# Patient Record
Sex: MALE | Race: WHITE | NOT HISPANIC OR LATINO | Employment: FULL TIME | URBAN - METROPOLITAN AREA
[De-identification: names, ages, dates, MRNs, and addresses within clinical notes are randomized per-mention and may not be internally consistent; named-entity substitution may affect disease eponyms.]

---

## 2020-07-02 ENCOUNTER — OFFICE VISIT (OUTPATIENT)
Dept: PRIMARY CARE CLINIC | Facility: CLINIC | Age: 30
End: 2020-07-02
Payer: OTHER GOVERNMENT

## 2020-07-02 VITALS
SYSTOLIC BLOOD PRESSURE: 142 MMHG | RESPIRATION RATE: 18 BRPM | TEMPERATURE: 99 F | HEART RATE: 102 BPM | DIASTOLIC BLOOD PRESSURE: 76 MMHG | OXYGEN SATURATION: 95 %

## 2020-07-02 DIAGNOSIS — R05.9 COUGH: ICD-10-CM

## 2020-07-02 DIAGNOSIS — Z03.818 ENCOUNTER FOR OBSERVATION FOR SUSPECTED EXPOSURE TO OTHER BIOLOGICAL AGENTS RULED OUT: ICD-10-CM

## 2020-07-02 PROCEDURE — 99203 OFFICE O/P NEW LOW 30 MIN: CPT | Mod: S$GLB,,, | Performed by: PHYSICIAN ASSISTANT

## 2020-07-02 PROCEDURE — 99203 PR OFFICE/OUTPT VISIT, NEW, LEVL III, 30-44 MIN: ICD-10-PCS | Mod: S$GLB,,, | Performed by: PHYSICIAN ASSISTANT

## 2020-07-02 PROCEDURE — U0003 INFECTIOUS AGENT DETECTION BY NUCLEIC ACID (DNA OR RNA); SEVERE ACUTE RESPIRATORY SYNDROME CORONAVIRUS 2 (SARS-COV-2) (CORONAVIRUS DISEASE [COVID-19]), AMPLIFIED PROBE TECHNIQUE, MAKING USE OF HIGH THROUGHPUT TECHNOLOGIES AS DESCRIBED BY CMS-2020-01-R: HCPCS

## 2020-07-02 NOTE — PROGRESS NOTES
Subjective:        Time seen by provider: 10:16 AM on 07/02/2020    Marlon Duval is a 29 y.o. male who presents for an evaluation of possible COVID-19. The patient c/o cough and sore throat. He denies any other symptoms at this time. He reports recent exposure to a coworker who tested positive for COVID-19. PSHx for nasal sinus surgery and tonsillectomy.     Review of Systems   Constitutional: Negative for activity change, appetite change, fatigue and fever.   HENT: Positive for sore throat. Negative for congestion and rhinorrhea.    Respiratory: Positive for cough. Negative for chest tightness, shortness of breath and wheezing.    Cardiovascular: Negative for chest pain and palpitations.   Gastrointestinal: Negative for diarrhea, nausea and vomiting.   Musculoskeletal: Negative for arthralgias and myalgias.   Skin: Negative for rash.   Neurological: Negative for weakness, light-headedness and headaches.       Objective:      Physical Exam  Vitals signs and nursing note reviewed.   Constitutional:       General: He is not in acute distress.     Appearance: He is well-developed. He is not diaphoretic.   HENT:      Head: Normocephalic and atraumatic.      Nose: Nose normal.   Eyes:      Conjunctiva/sclera: Conjunctivae normal.   Neck:      Musculoskeletal: Normal range of motion.   Cardiovascular:      Rate and Rhythm: Normal rate and regular rhythm.      Heart sounds: Normal heart sounds. No murmur.   Pulmonary:      Effort: Pulmonary effort is normal. No respiratory distress.      Breath sounds: Normal breath sounds. No wheezing.   Musculoskeletal: Normal range of motion.   Skin:     General: Skin is warm and dry.   Neurological:      Mental Status: He is alert and oriented to person, place, and time.         Assessment:       Suspected COVID-19    Plan:       1. Suspected COVID-19  COVID-19 Screening Test   2. Discharge home and await results.   3. Return to clinic or ED for new or worsening symptoms.   4.  Follow-up with PCP as needed.     Scribe Attestation:   I, Janene Florence, am scribing for, and in the presence of, Ronit Barron PA-C. I performed the above scribed service and the documentation accurately describes the services I performed. I attest to the accuracy of the note.    I, Ronit Barron PA-C, personally performed the services described in this documentation. All medical record entries made by the scribe were at my direction and in my presence.  I have reviewed the chart and agree that the record reflects my personal performance and is accurate and complete. Ronit Barron PA-C.  7:25 PM 07/02/2020

## 2020-07-03 LAB — SARS-COV-2 RNA RESP QL NAA+PROBE: NOT DETECTED

## 2020-07-03 NOTE — PATIENT INSTRUCTIONS

## 2020-07-05 ENCOUNTER — HOSPITAL ENCOUNTER (EMERGENCY)
Facility: HOSPITAL | Age: 30
Discharge: HOME | End: 2020-07-05
Attending: EMERGENCY MEDICINE
Payer: OTHER GOVERNMENT

## 2020-07-05 VITALS
SYSTOLIC BLOOD PRESSURE: 128 MMHG | OXYGEN SATURATION: 99 % | BODY MASS INDEX: 27.72 KG/M2 | RESPIRATION RATE: 18 BRPM | HEIGHT: 71 IN | TEMPERATURE: 101 F | WEIGHT: 198 LBS | DIASTOLIC BLOOD PRESSURE: 78 MMHG | HEART RATE: 104 BPM

## 2020-07-05 DIAGNOSIS — J06.9 VIRAL URI WITH COUGH: Primary | ICD-10-CM

## 2020-07-05 PROCEDURE — 99283 EMERGENCY DEPT VISIT LOW MDM: CPT | Mod: 25

## 2020-07-05 PROCEDURE — U0003 INFECTIOUS AGENT DETECTION BY NUCLEIC ACID (DNA OR RNA); SEVERE ACUTE RESPIRATORY SYNDROME CORONAVIRUS 2 (SARS-COV-2) (CORONAVIRUS DISEASE [COVID-19]), AMPLIFIED PROBE TECHNIQUE, MAKING USE OF HIGH THROUGHPUT TECHNOLOGIES AS DESCRIBED BY CMS-2020-01-R: HCPCS

## 2020-07-05 PROCEDURE — 25000003 PHARM REV CODE 250: Performed by: EMERGENCY MEDICINE

## 2020-07-05 RX ORDER — AZITHROMYCIN 250 MG/1
500 TABLET, FILM COATED ORAL
Status: DISCONTINUED | OUTPATIENT
Start: 2020-07-05 | End: 2020-07-05 | Stop reason: HOSPADM

## 2020-07-05 RX ORDER — MINERAL OIL
ENEMA (ML) RECTAL
COMMUNITY
Start: 2020-04-29

## 2020-07-05 RX ORDER — IBUPROFEN 600 MG/1
600 TABLET ORAL
Status: COMPLETED | OUTPATIENT
Start: 2020-07-05 | End: 2020-07-05

## 2020-07-05 RX ORDER — AZELASTINE 1 MG/ML
SPRAY, METERED NASAL
COMMUNITY
Start: 2020-04-29

## 2020-07-05 RX ORDER — AZITHROMYCIN 250 MG/1
250 TABLET, FILM COATED ORAL DAILY
Qty: 4 TABLET | Refills: 0 | Status: SHIPPED | OUTPATIENT
Start: 2020-07-05 | End: 2020-07-09

## 2020-07-05 RX ADMIN — IBUPROFEN 600 MG: 600 TABLET, FILM COATED ORAL at 07:07

## 2020-07-05 NOTE — ED TRIAGE NOTES
Marlon WALESKA Duval is here with cough, fever and chills for 2-3 days with negative COVID test 7/2.

## 2020-07-05 NOTE — ED NOTES
Patient identifiers for Marlon Duval checked and correct.  LOC:  Marlon Duval is awake, alert, and aware of environment with an appropriate affect. He is oriented x 3 and speaking appropriately.  APPEARANCE:  He is resting comfortably and in mild/moderate distress. He is clean and well groomed, patient's clothing is properly fastened.  SKIN:  The skin is warm and dry. He has normal skin turgor and moist mucus membranes. Skin is intact; no bruising or breakdown noted. Fever noted at triager  MUSCULOSKELETAL:  He is moving all extremities well, no obvious deformities noted. Pulses intact. Generalized body aches ant fatigue   RESPIRATORY:  Airway is open and patent. Respirations are spontaneous and non-labored with normal effort and rate. Frequent dry cough noted on exam   CARDIAC:  He has a normal rate and rhythm. No peripheral edema noted. Capillary refill < 3 seconds.  ABDOMEN:  No distention noted.  Soft and non-tender upon palpation.  NEUROLOGICAL:  PERRL. Facial expression is symmetrical. Hand grasps are equal bilaterally. Normal sensation in all extremities when touched with finger.  Allergies reported:    Review of patient's allergies indicates:   Allergen Reactions    Morphine Hives     OTHER NOTES:    
Tachypneic, + diffuse B/L expiratory wheezing, R lung somewhat decreased BS, no retractions noted.

## 2020-07-06 LAB — SARS-COV-2 RNA RESP QL NAA+PROBE: NOT DETECTED

## 2020-07-06 NOTE — ED PROVIDER NOTES
Encounter Date: 7/5/2020    SCRIBE #1 NOTE: I, Regi Ibarra, am scribing for, and in the presence of, Cooper Hilton MD.       History     Chief Complaint   Patient presents with    Fever     chills, cough and body aches       Time seen by provider: 7:08 PM on 07/05/2020    Marlon Duval is a 29 y.o. male who presents to the ED with complaints of cough, fever, body aches, and chills that started 4 days ago. 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 Patient was tested negative for COVID during the time of onset of symptoms. He denies SOB, swelling, nausea, or vomiting. He requests to be retested. Patient has no other medical concerns or complaints at this moment. No PMHx. PSHx includes adenoidectomy and tympanostomy tube placement.     The history is provided by the patient.     Review of patient's allergies indicates:   Allergen Reactions    Morphine Hives     History reviewed. No pertinent past medical history.  Past Surgical History:   Procedure Laterality Date    ADENOIDECTOMY      EYE SURGERY      NASAL SINUS SURGERY      ORBITAL RECONSTRUCTION      TONSILLECTOMY      TYMPANOSTOMY TUBE PLACEMENT       History reviewed. No pertinent family history.  Social History     Tobacco Use    Smoking status: Never Smoker    Smokeless tobacco: Never Used   Substance Use Topics    Alcohol use: Not Currently     Frequency: Never    Drug use: Not on file     Review of Systems   Constitutional: Positive for chills and fever.   HENT: Negative for congestion.    Respiratory: Positive for cough. Negative for shortness of breath.    Cardiovascular: Negative for chest pain and leg swelling.   Gastrointestinal: Negative for abdominal pain, nausea and vomiting.   Musculoskeletal: Positive for myalgias.   Skin: Negative for pallor and rash.   Neurological: Negative for headaches.   Hematological: Does not bruise/bleed easily.    Psychiatric/Behavioral: The patient is not nervous/anxious.        Physical Exam     Initial Vitals [07/05/20 1831]   BP Pulse Resp Temp SpO2   (!) 152/84 (!) 127 18 (!) 102.6 °F (39.2 °C) 98 %      MAP       --         Physical Exam    Nursing note and vitals reviewed.  Constitutional: He appears well-developed and well-nourished. He is not diaphoretic. No distress.   HENT:   Head: Normocephalic and atraumatic.   Mouth/Throat: Oropharynx is clear and moist. No oropharyngeal exudate.   Eyes: Conjunctivae are normal.   Neck: Neck supple. No tracheal deviation present.   Cardiovascular: Regular rhythm, normal heart sounds and intact distal pulses. Tachycardia present.  Exam reveals no gallop and no friction rub.    No murmur heard.  Pulmonary/Chest: No accessory muscle usage or stridor. No tachypnea. He has no wheezes. He has rhonchi. He has no rales.   Scant rhonchi at right posterior lung base. No rales or wheezing. No tachypnea or accessory muscle use. Speaks in complete sentences.    Abdominal: Soft. He exhibits no distension. There is no abdominal tenderness. There is no guarding.   Musculoskeletal: Normal range of motion. No edema.   Neurological: He is alert and oriented to person, place, and time.   Skin: Skin is warm. Capillary refill takes less than 2 seconds. No rash noted. No erythema.   Psychiatric: He has a normal mood and affect. Thought content normal.         ED Course   Procedures  Labs Reviewed   SARS-COV-2 (COVID-19) QUALITATIVE PCR          Imaging Results    None          Medical Decision Making:   History:   Old Medical Records: I decided to obtain old medical records.  Clinical Tests:   Lab Tests: Reviewed and Ordered  Radiological Study: Reviewed and Ordered            Scribe Attestation:   Scribe #1: I performed the above scribed service and the documentation accurately describes the services I performed. I attest to the accuracy of the note.      I, Dr. Cooper Hilton, personally  performed the services described in this documentation. All medical record entries made by the scribe were at my direction and in my presence.  I have reviewed the chart and agree that the record reflects my personal performance and is accurate and complete. Cooper Hilton MD.  9:49 PM 07/05/2020    Marlon Duval is a 29 y.o. male presenting with viral URI with cough.  He does have potential Coronavirus/COVID-19 exposure.  Initial testing was negative. I will repeat today.  This will be pending.  He is in no distress with no sign of significant end-organ dysfunction.  There is a right lower lobe infiltrate.  Differential including viral pneumonia versus bacterial pneumonia discussed with patient.  He does elect to initiate antibiotics in case of less likely bacterial pneumonia.  I doubt sepsis.  Tachycardia decreases appropriately with antipyretic without other intervention.  I do not think further diagnostic studies, lactic acid, blood cultures are indicated.  He does not require admission for IV antibiotics.  I have very low suspicion for alternative process such as PE or heart failure.  Follow up with PCP.  Self isolate at home.  Detailed return precautions reviewed.          ED Course as of Jul 05 2149   Sun Jul 05, 2020 1945 CXR:  RLL infiltrate. (my read)    [MR]      ED Course User Index  [MR] Cooper Hilton MD        Clinical Impression:       ICD-10-CM ICD-9-CM   1. Viral URI with cough  J06.9 465.9    B97.89                                 Cooper Hilton MD  07/05/20 6857

## 2020-07-06 NOTE — DISCHARGE INSTRUCTIONS
Your pattern of symptoms seems more like a virus, possibly Coronavirus with testing pending, but an antibiotic has been prescribed in case of bacterial pneumonia pending further testing.  Follow up with her doctor for reassessment.  As we discussed, return to the emergency department for new worsening symptoms including difficulty breathing.

## 2021-04-16 ENCOUNTER — PATIENT MESSAGE (OUTPATIENT)
Dept: RESEARCH | Facility: HOSPITAL | Age: 31
End: 2021-04-16

## 2021-09-10 ENCOUNTER — IMMUNIZATION (OUTPATIENT)
Dept: FAMILY MEDICINE | Facility: CLINIC | Age: 31
End: 2021-09-10
Payer: OTHER GOVERNMENT

## 2021-09-10 DIAGNOSIS — Z23 NEED FOR VACCINATION: Primary | ICD-10-CM

## 2021-09-10 PROCEDURE — 0001A COVID-19, MRNA, LNP-S, PF, 30 MCG/0.3 ML DOSE VACCINE: ICD-10-PCS | Mod: CV19,,, | Performed by: FAMILY MEDICINE

## 2021-09-10 PROCEDURE — 0001A COVID-19, MRNA, LNP-S, PF, 30 MCG/0.3 ML DOSE VACCINE: CPT | Mod: CV19,,, | Performed by: FAMILY MEDICINE

## 2021-09-10 PROCEDURE — 91300 COVID-19, MRNA, LNP-S, PF, 30 MCG/0.3 ML DOSE VACCINE: CPT | Mod: ,,, | Performed by: FAMILY MEDICINE

## 2021-09-10 PROCEDURE — 91300 COVID-19, MRNA, LNP-S, PF, 30 MCG/0.3 ML DOSE VACCINE: ICD-10-PCS | Mod: ,,, | Performed by: FAMILY MEDICINE

## 2022-04-28 ENCOUNTER — HOSPITAL ENCOUNTER (EMERGENCY)
Facility: HOSPITAL | Age: 32
Discharge: HOME | End: 2022-04-28
Attending: EMERGENCY MEDICINE
Payer: OTHER GOVERNMENT

## 2022-04-28 VITALS
HEIGHT: 71 IN | HEART RATE: 107 BPM | DIASTOLIC BLOOD PRESSURE: 76 MMHG | TEMPERATURE: 100 F | BODY MASS INDEX: 29.4 KG/M2 | RESPIRATION RATE: 19 BRPM | OXYGEN SATURATION: 97 % | SYSTOLIC BLOOD PRESSURE: 152 MMHG | WEIGHT: 210 LBS

## 2022-04-28 DIAGNOSIS — R50.9 FEVER: ICD-10-CM

## 2022-04-28 DIAGNOSIS — U07.1 COVID-19 VIRUS DETECTED: ICD-10-CM

## 2022-04-28 DIAGNOSIS — R05.9 COUGH: Primary | ICD-10-CM

## 2022-04-28 DIAGNOSIS — B34.9 VIRAL ILLNESS: ICD-10-CM

## 2022-04-28 LAB
GROUP A STREP, MOLECULAR: NEGATIVE
INFLUENZA A, MOLECULAR: NEGATIVE
INFLUENZA B, MOLECULAR: NEGATIVE
SARS-COV-2 RDRP RESP QL NAA+PROBE: POSITIVE
SPECIMEN SOURCE: NORMAL

## 2022-04-28 PROCEDURE — 99283 EMERGENCY DEPT VISIT LOW MDM: CPT | Mod: 25

## 2022-04-28 PROCEDURE — 25000003 PHARM REV CODE 250: Performed by: EMERGENCY MEDICINE

## 2022-04-28 PROCEDURE — 87651 STREP A DNA AMP PROBE: CPT | Performed by: EMERGENCY MEDICINE

## 2022-04-28 PROCEDURE — U0002 COVID-19 LAB TEST NON-CDC: HCPCS | Performed by: EMERGENCY MEDICINE

## 2022-04-28 PROCEDURE — 87502 INFLUENZA DNA AMP PROBE: CPT | Performed by: EMERGENCY MEDICINE

## 2022-04-28 RX ORDER — IBUPROFEN 400 MG/1
400 TABLET ORAL
Status: COMPLETED | OUTPATIENT
Start: 2022-04-28 | End: 2022-04-28

## 2022-04-28 RX ADMIN — IBUPROFEN 400 MG: 400 TABLET ORAL at 03:04

## 2022-04-28 NOTE — DISCHARGE INSTRUCTIONS
Motrin every 6 hours for fever, Tylenol every 4 hours  Home quarantine for the next 5 days  Return if your condition becomes worse for any concerns

## 2022-04-28 NOTE — Clinical Note
"Marlon"Jyotsna Duval was seen and treated in our emergency department on 4/28/2022.     COVID-19 is present in our communities across the state. There is limited testing for COVID at this time, so not all patients can be tested. In this situation, your employee meets the following criteria:    Marlon Duval has met the criteria for COVID-19 testing and has a POSITIVE result. He can return to work once they are asymptomatic for 24 hours without the use of fever reducing medications AND at least five days from the first positive result. A mask is recommended for 5 days post quarantine.     If you have any questions or concerns, or if I can be of further assistance, please do not hesitate to contact me.    Sincerely,             NATALIE Crain"

## 2022-04-28 NOTE — ED PROVIDER NOTES
Encounter Date: 4/28/2022       History     Chief Complaint   Patient presents with    Fever     Fever (104 at home), chills, diarrhea x 3 days.  Tylenol at 6 am today.     31-year-old well-appearing male presents emergency department with complaint of fever, body aches, chills, loose stools, not productive sputum for the last 3 days, and a sore throat.  Patient denies any ill contacts he has been previously vaccinated from COVID however he has not had a booster.  The patient denies any chest pain, shortness of breath, or abdominal pain.  Patient reports he has taken Tylenol for fever last dose at 6:30 a.m. this morning.        Review of patient's allergies indicates:   Allergen Reactions    Morphine Hives     No past medical history on file.  Past Surgical History:   Procedure Laterality Date    ADENOIDECTOMY      EYE SURGERY      NASAL SINUS SURGERY      ORBITAL RECONSTRUCTION      TONSILLECTOMY      TYMPANOSTOMY TUBE PLACEMENT       No family history on file.  Social History     Tobacco Use    Smoking status: Never Smoker    Smokeless tobacco: Never Used   Substance Use Topics    Alcohol use: Not Currently     Review of Systems   Constitutional: Positive for chills and fever.   HENT: Positive for congestion, rhinorrhea and sore throat.    Eyes: Negative.    Respiratory: Positive for cough. Negative for wheezing.    Cardiovascular: Negative.    Gastrointestinal: Positive for diarrhea. Negative for abdominal pain.   Genitourinary: Negative.    Musculoskeletal: Negative.    Skin: Negative.    Allergic/Immunologic: Negative.    Neurological: Negative.    Hematological: Negative.    Psychiatric/Behavioral: Negative.    All other systems reviewed and are negative.      Physical Exam     Initial Vitals [04/28/22 1435]   BP Pulse Resp Temp SpO2   129/81 109 20 99.5 °F (37.5 °C) 97 %      MAP       --         Physical Exam    Nursing note and vitals reviewed.  Constitutional: He appears well-developed and  well-nourished.   HENT:   Head: Normocephalic and atraumatic.   Right Ear: External ear normal.   Nose: Nose normal.   Mouth/Throat: No oropharyngeal exudate.   Eyes: EOM are normal. Pupils are equal, round, and reactive to light.   Neck: Neck supple.   Normal range of motion.  Cardiovascular: Normal rate, regular rhythm, normal heart sounds and intact distal pulses.   Pulmonary/Chest: Breath sounds normal.   Abdominal: Abdomen is soft. Bowel sounds are normal. There is no abdominal tenderness.   Musculoskeletal:      Cervical back: Normal range of motion and neck supple.     Neurological: He is alert. He has normal strength. GCS score is 15. GCS eye subscore is 4. GCS verbal subscore is 5. GCS motor subscore is 6.   Skin: Skin is warm. No rash noted.   Psychiatric: He has a normal mood and affect.         ED Course   Procedures  Labs Reviewed   SARS-COV-2 RNA AMPLIFICATION, QUAL - Abnormal; Notable for the following components:       Result Value    SARS-CoV-2 RNA, Amplification, Qual Positive (*)     All other components within normal limits   GROUP A STREP, MOLECULAR   INFLUENZA A AND B ANTIGEN    Narrative:     Specimen Source->Nasopharyngeal Swab          Imaging Results          X-Ray Chest PA And Lateral (Final result)  Result time 04/28/22 15:33:09    Final result by Joon Padilla MD (04/28/22 15:33:09)                 Narrative:    CHEST PA AND LATERAL    CLINICAL DATA:Fever, cough.    FINDINGS: PA and lateral views demonstrate no cardiac, pulmonary, or osseous abnormalities.    IMPRESSION:  1. Normal two-view chest.    Electronically signed by:  Joon Padilla MD  4/28/2022 3:33 PM CDT Workstation: [id number]                               Medications   ibuprofen tablet 400 mg (has no administration in time range)     Medical Decision Making:   Initial Assessment:   31-year-old well-appearing male presents emergency department with complaint of fever, body aches, chills, loose stools, not  productive sputum for the last 3 days, and a sore throat.  Patient denies any ill contacts he has been previously vaccinated from COVID however he has not had a booster.  The patient denies any chest pain, shortness of breath, or abdominal pain.  Patient reports he has taken Tylenol for fever last dose at 6:30 a.m. this morning.        Differential Diagnosis:   COVID, influenza, strep, other viral illness  ED Management:  31-year-old well-appearing male presents emergency department with complaint of fever, body aches, chills, nonproductive cough, sore throat, and loose stools for the last 3 days.  Patient has been previously vaccinated from COVID however he has not had a booster the patient did test positive for COVID here today remaining test are negative including strep, and influenza.  Chest x-ray reveals no consolidated pneumonia.  Patient will be instructed to alternate Motrin and Tylenol for fever, he was instructed on home quarantine for the next 5 days instructed to return for any concerns or for condition becomes worse.                      Clinical Impression:   Final diagnoses:  [R50.9] Fever  [R05.9] Cough (Primary)  [B34.9] Viral illness  [U07.1] COVID-19 virus detected          ED Disposition Condition    Discharge Stable        ED Prescriptions     None        Follow-up Information    None          Margareth Gomez, NATALIE  04/28/22 5371

## 2023-08-15 ENCOUNTER — OFFICE VISIT (OUTPATIENT)
Dept: PULMONOLOGY | Facility: CLINIC | Age: 33
End: 2023-08-15
Payer: OTHER GOVERNMENT

## 2023-08-15 ENCOUNTER — HOSPITAL ENCOUNTER (OUTPATIENT)
Dept: RADIOLOGY | Facility: HOSPITAL | Age: 33
Discharge: HOME | End: 2023-08-15
Attending: NURSE PRACTITIONER
Payer: OTHER GOVERNMENT

## 2023-08-15 ENCOUNTER — TELEPHONE (OUTPATIENT)
Dept: PULMONOLOGY | Facility: CLINIC | Age: 33
End: 2023-08-15

## 2023-08-15 VITALS
BODY MASS INDEX: 31.19 KG/M2 | SYSTOLIC BLOOD PRESSURE: 132 MMHG | WEIGHT: 223.63 LBS | DIASTOLIC BLOOD PRESSURE: 72 MMHG | OXYGEN SATURATION: 98 % | HEART RATE: 96 BPM

## 2023-08-15 DIAGNOSIS — R06.00 DYSPNEA, UNSPECIFIED TYPE: ICD-10-CM

## 2023-08-15 DIAGNOSIS — R06.00 DYSPNEA, UNSPECIFIED TYPE: Primary | ICD-10-CM

## 2023-08-15 DIAGNOSIS — T78.40XS ALLERGY, SEQUELA: ICD-10-CM

## 2023-08-15 PROCEDURE — 99204 PR OFFICE/OUTPT VISIT, NEW, LEVL IV, 45-59 MIN: ICD-10-PCS | Mod: S$GLB,,, | Performed by: NURSE PRACTITIONER

## 2023-08-15 PROCEDURE — 99204 OFFICE O/P NEW MOD 45 MIN: CPT | Mod: S$GLB,,, | Performed by: NURSE PRACTITIONER

## 2023-08-15 PROCEDURE — 71046 X-RAY EXAM CHEST 2 VIEWS: CPT | Mod: TC

## 2023-08-15 RX ORDER — IBUPROFEN 800 MG/1
800 TABLET ORAL 2 TIMES DAILY
COMMUNITY

## 2023-08-15 RX ORDER — LORATADINE 10 MG/1
10 TABLET ORAL DAILY
COMMUNITY

## 2023-08-15 NOTE — PROGRESS NOTES
SUBJECTIVE:    Patient ID: Marlon Duval is a 32 y.o. male.    Chief Complaint: New Patient (New Patient/Referred by Feroz Lerner for exercise induced bronchospasm)    HPI  Patient here today to be evaluated for exercise induced bronchospasm.    He states over the last 5 months he has had more shortness of breath and occasional wheezing.  He was using Albuterol multiple times a day. He was then started on Flovent and is not having to use the Albuterol as often.  He does use the albuterol when he plays sports.  His Peak flow is 500. He has not had PFT.  He has a history of significant allergies, did get allergy shots as a teenager.  He is a  for the [employer].  He has had asbestos exposure with work.       No past medical history on file.  Past Surgical History:   Procedure Laterality Date    ADENOIDECTOMY      EYE SURGERY      NASAL SINUS SURGERY      ORBITAL RECONSTRUCTION      TONSILLECTOMY      TYMPANOSTOMY TUBE PLACEMENT       No family history on file.     Social History:   Marital Status: Single  Occupation:  for [employer].   Alcohol History:  reports that he does not currently use alcohol.  Tobacco History:  reports that he has never smoked. He has never used smokeless tobacco.  Drug History:  has no history on file for drug use.    Review of patient's allergies indicates:   Allergen Reactions    Morphine Hives       Current Outpatient Medications   Medication Sig Dispense Refill    azelastine (ASTELIN) 137 mcg (0.1 %) nasal spray U 1 SPR IEN BID PRN      fexofenadine (ALLEGRA) 180 MG tablet       ibuprofen (ADVIL,MOTRIN) 800 MG tablet Take 800 mg by mouth 2 (two) times a day.      loratadine (CLARITIN) 10 mg tablet Take 10 mg by mouth once daily.      diclofenac (VOLTAREN) 75 MG EC tablet Take 1 tablet (75 mg total) by mouth 2 (two) times daily. (Patient not taking: Reported on 8/15/2023) 25 tablet 0    famotidine (PEPCID) 20 MG tablet Take 1 tablet (20 mg total) by mouth 2 (two) times  daily. for 5 days 20 tablet 0     No current facility-administered medications for this visit.           Review of Systems  General: Feeling Well.no fever, no night sweats  Eyes: Vision is good.  ENT:  No sinusitis or pharyngitis.   Heart:: No chest pain or palpitations.  Lungs: dyspnea with exertion occasional wheeze   GI: No Nausea, vomiting, constipation, diarrhea, or reflux.  : No dysuria, hesitancy, or nocturia.  Musculoskeletal: No joint pain or myalgias.  Skin: No lesions or rashes.  Neuro: No headaches or neuropathy.  Lymph: No edema or adenopathy.  Psych: No anxiety or depression.  Endo: No weight change.    OBJECTIVE:      /72 (BP Location: Right arm, Patient Position: Sitting, BP Method: Large (Manual))   Pulse 96   Wt 101.4 kg (223 lb 9.6 oz)   SpO2 98%   BMI 31.19 kg/m²     Physical Exam  GENERAL: younger patient in no distress.  HEENT: Pupils equal and reactive. Extraocular movements intact. Nose intact.      Pharynx moist.  NECK: Supple.   HEART: Regular rate and rhythm. No murmur or gallop auscultated.  LUNGS: Clear to auscultation and percussion. Lung excursion symmetrical. No change in fremitus. No adventitial noises.  ABDOMEN: Bowel sounds present. Non-tender, no masses palpated.  EXTREMITIES: Normal muscle tone and joint movement, no cyanosis or clubbing.   LYMPHATICS: No adenopathy palpated, no edema.  SKIN: Dry, intact, no lesions.   NEURO: Cranial nerves II-XII intact. Motor strength 5/5 bilaterally, upper and lower extremities.  PSYCH: Appropriate affect.    Assessment:       1. Dyspnea, unspecified type    2. Allergy, sequela          Plan:       Dyspnea, unspecified type  -     Complete PFT with bronchodilator; Future  -     X-Ray Chest PA And Lateral; Future    Allergy, sequela  -     Ambulatory referral/consult to Allergy; Future; Expected date: 08/22/2023         Follow up in about 4 weeks (around 9/12/2023).        PFT   If PFT is normal will do methacholine challenge    Refer to Dr. Garcia  for allergy testing  Use your Flovent 2 puffs twice a day, rinse after you it   Your rescue inhaler 2 puffs as needed for shortness of breath   You can do the rescue inhaler 2 inhales 15 minutes before you exercise  Chest xray

## 2023-08-15 NOTE — PATIENT INSTRUCTIONS
PFT   If PFT is normal will do methacholine challenge   Refer to Dr. Garcia  for allergy testing  Use your Flovent 2 puffs twice a day, rinse after you it   Your rescue inhaler 2 puffs as needed for shortness of breath   You can do the rescue inhaler 2 inhales 15 minutes before you exercise  Chest xray

## 2023-09-01 ENCOUNTER — HOSPITAL ENCOUNTER (OUTPATIENT)
Dept: PULMONOLOGY | Facility: HOSPITAL | Age: 33
Discharge: HOME | End: 2023-09-01
Attending: NURSE PRACTITIONER
Payer: OTHER GOVERNMENT

## 2023-09-01 DIAGNOSIS — R06.00 DYSPNEA, UNSPECIFIED TYPE: ICD-10-CM

## 2023-09-01 PROCEDURE — 94727 GAS DIL/WSHOT DETER LNG VOL: CPT

## 2023-09-01 PROCEDURE — 94010 BREATHING CAPACITY TEST: CPT

## 2023-09-01 PROCEDURE — 94729 DIFFUSING CAPACITY: CPT

## 2023-09-05 ENCOUNTER — TELEPHONE (OUTPATIENT)
Dept: PULMONOLOGY | Facility: CLINIC | Age: 33
End: 2023-09-05

## 2023-09-05 DIAGNOSIS — R06.00 DYSPNEA, UNSPECIFIED TYPE: Primary | ICD-10-CM

## 2023-09-06 NOTE — TELEPHONE ENCOUNTER
Called patient let him know PFT is normal, Sophia has ordered the methacholine to evaluate for asthma.  [facility] scheduling will call to schedule.  He verbalized an understanding.

## 2023-09-15 ENCOUNTER — TELEPHONE (OUTPATIENT)
Dept: PULMONOLOGY | Facility: CLINIC | Age: 33
End: 2023-09-15

## 2023-09-15 NOTE — TELEPHONE ENCOUNTER
Left message informing patient he does not have to come to appointment today 09/15/2023, because he needs to come in after his methacholine test on 9/21/2023.  Please call back to reschedule after the test is done at [phone].

## 2023-09-21 ENCOUNTER — HOSPITAL ENCOUNTER (OUTPATIENT)
Dept: PULMONOLOGY | Facility: HOSPITAL | Age: 33
Discharge: HOME | End: 2023-09-21
Attending: NURSE PRACTITIONER
Payer: OTHER GOVERNMENT

## 2023-09-21 ENCOUNTER — TELEPHONE (OUTPATIENT)
Dept: PULMONOLOGY | Facility: CLINIC | Age: 33
End: 2023-09-21

## 2023-09-21 VITALS — HEART RATE: 94 BPM | OXYGEN SATURATION: 100 % | RESPIRATION RATE: 16 BRPM

## 2023-09-21 DIAGNOSIS — R06.00 DYSPNEA, UNSPECIFIED TYPE: ICD-10-CM

## 2023-09-21 PROCEDURE — 94070 EVALUATION OF WHEEZING: CPT

## 2023-09-21 PROCEDURE — 94060 EVALUATION OF WHEEZING: CPT | Mod: 59

## 2023-09-21 RX ORDER — METHACHOLINE CHLORIDE 12 MG/3 ML
3 VIAL, NEBULIZER (ML) INHALATION ONCE
Status: COMPLETED | OUTPATIENT
Start: 2023-09-21 | End: 2023-09-21

## 2023-09-21 RX ORDER — METHACHOLINE CHLORIDE 3 MG/3 ML
3 VIAL, NEBULIZER (ML) INHALATION ONCE
Status: COMPLETED | OUTPATIENT
Start: 2023-09-21 | End: 2023-09-21

## 2023-09-21 RX ORDER — METHACHOLINE CHLORIDE 0 MG/3 ML
3 VIAL, NEBULIZER (ML) INHALATION ONCE
Status: DISCONTINUED | OUTPATIENT
Start: 2023-09-21 | End: 2023-09-22 | Stop reason: HOSPADM

## 2023-09-21 RX ORDER — SODIUM CHLORIDE FOR INHALATION 0.9 %
3 VIAL, NEBULIZER (ML) INHALATION ONCE
Status: DISCONTINUED | OUTPATIENT
Start: 2023-09-21 | End: 2023-09-22 | Stop reason: HOSPADM

## 2023-09-21 RX ORDER — METHACHOLINE CHLORIDE 0.1875/3ML
3 VIAL, NEBULIZER (ML) INHALATION ONCE
Status: COMPLETED | OUTPATIENT
Start: 2023-09-21 | End: 2023-09-21

## 2023-09-21 RX ORDER — METHACHOLINE CHLORIDE 0.75/3ML
3 VIAL, NEBULIZER (ML) INHALATION ONCE
Status: COMPLETED | OUTPATIENT
Start: 2023-09-21 | End: 2023-09-21

## 2023-09-21 RX ORDER — METHACHOLINE CHLORIDE 48 MG/3 ML
3 VIAL, NEBULIZER (ML) INHALATION ONCE
Status: COMPLETED | OUTPATIENT
Start: 2023-09-21 | End: 2023-09-21

## 2023-09-21 RX ADMIN — Medication 48 MG: at 03:09

## 2023-09-21 RX ADMIN — Medication 0.19 MG: at 03:09

## 2023-09-21 RX ADMIN — Medication 0.75 MG: at 03:09

## 2023-09-21 RX ADMIN — Medication 12 MG: at 03:09

## 2023-09-21 RX ADMIN — Medication 3 MG: at 03:09

## 2023-09-22 NOTE — TELEPHONE ENCOUNTER
Spoke with the patient. He states he had a very rough time with the test, felt like he was going to pass out.

## 2023-10-04 ENCOUNTER — TELEPHONE (OUTPATIENT)
Dept: PULMONOLOGY | Facility: CLINIC | Age: 33
End: 2023-10-04

## 2023-10-04 DIAGNOSIS — R06.09 DYSPNEA ON EXERTION: Primary | ICD-10-CM

## 2023-10-04 RX ORDER — MONTELUKAST SODIUM 10 MG/1
10 TABLET ORAL NIGHTLY
Qty: 30 TABLET | Refills: 3 | Status: SHIPPED | OUTPATIENT
Start: 2023-10-04 | End: 2024-02-01

## 2023-10-04 NOTE — TELEPHONE ENCOUNTER
Spoke with him. He has dyspnea with exertion especially walking up stairs. And wheezing. I have sent singulair to take at night. Will order a walk and an ECHO as well.  I told him it is fine to continue the inhaled steroid if he feels benefit from it for now. He will make another appointment with an allergist as the one he had here was cancelled.

## 2023-10-13 ENCOUNTER — HOSPITAL ENCOUNTER (OUTPATIENT)
Dept: PULMONOLOGY | Facility: HOSPITAL | Age: 33
Discharge: HOME | End: 2023-10-13
Attending: NURSE PRACTITIONER
Payer: OTHER GOVERNMENT

## 2023-10-13 VITALS — WEIGHT: 223 LBS | HEIGHT: 71 IN | BODY MASS INDEX: 31.22 KG/M2

## 2023-10-13 DIAGNOSIS — R06.09 DYSPNEA ON EXERTION: ICD-10-CM

## 2023-10-13 PROCEDURE — 94618 PULMONARY STRESS TESTING: CPT

## 2023-10-13 NOTE — CARE UPDATE
"   10/13/23 0756   6MW Test   Ordering Provider Annette Coates, NATALIE   Diagnosis Shortness of Breath   Height 5' 11" (1.803 m)   Weight 101.2 kg (223 lb)   BMI (Calculated) 31.1   Predicted Distance Meters (Calculated) 727.24 meters   Patient Race    6MWT Status completed without stopping   Patient Reported Dyspnea   Was O2 used? No   6MW Distance walked (feet) 2100 feet   Distance walked (meters) 640.08 meters   Did patient stop? No   Type of assistive device(s) used? no assistive devices   Is extra documentation required for this patient? Yes   Pre-Exercise   Oxygen Saturation 98 %   Supplemental Oxygen Room Air   Heart Rate 86 bpm   Sabrina Dyspnea Rating  nothing at all   Post Exercise   Oxygen Saturation 99 %   Supplemental Oxygen Room Air   Heart Rate 106 bpm   Sabrina Dyspnea Rating  very light   Recovery   Oxygen Saturation 99 %   Supplemental Oxygen Room Air   Heart Rate 100 bpm   Sabrina Dyspnea Rating  light   Interpretation   Is procedure ready for interpretation? Yes   Did the patient stop or pause? No   Total Laps Walked 10.5   Final Partial Lap Distance (feet) 0 feet   Total Distance Feet (Calculated) 2100 feet   Total Distance Meters (Calculated) 640.08 meters   Percentage of Predicted (Calculated) 88.01   Peak VO2 (Calculated) 23.18   Mets 6.62   Has The Patient Had a Previous Six Minute Walk Test? No   Comments This is a Non-Hypoxemic 6 min. walk.   Oxygen Qualification   Oxygen Qualification? No       "

## 2023-10-17 ENCOUNTER — TELEPHONE (OUTPATIENT)
Dept: PULMONOLOGY | Facility: CLINIC | Age: 33
End: 2023-10-17

## 2023-10-17 ENCOUNTER — PATIENT MESSAGE (OUTPATIENT)
Dept: PULMONOLOGY | Facility: CLINIC | Age: 33
End: 2023-10-17

## 2024-06-23 ENCOUNTER — HOSPITAL ENCOUNTER (EMERGENCY)
Facility: HOSPITAL | Age: 34
Discharge: HOME | End: 2024-06-23
Attending: STUDENT IN AN ORGANIZED HEALTH CARE EDUCATION/TRAINING PROGRAM
Payer: OTHER GOVERNMENT

## 2024-06-23 VITALS
WEIGHT: 210 LBS | DIASTOLIC BLOOD PRESSURE: 86 MMHG | TEMPERATURE: 98 F | SYSTOLIC BLOOD PRESSURE: 137 MMHG | BODY MASS INDEX: 29.4 KG/M2 | OXYGEN SATURATION: 99 % | HEIGHT: 71 IN | RESPIRATION RATE: 18 BRPM | HEART RATE: 82 BPM

## 2024-06-23 DIAGNOSIS — S91.339A PUNCTURE WOUND OF FOOT: ICD-10-CM

## 2024-06-23 PROCEDURE — 90471 IMMUNIZATION ADMIN: CPT | Performed by: PHYSICIAN ASSISTANT

## 2024-06-23 PROCEDURE — 63600175 PHARM REV CODE 636 W HCPCS: Performed by: PHYSICIAN ASSISTANT

## 2024-06-23 PROCEDURE — 25000003 PHARM REV CODE 250: Performed by: PHYSICIAN ASSISTANT

## 2024-06-23 PROCEDURE — 90715 TDAP VACCINE 7 YRS/> IM: CPT | Performed by: PHYSICIAN ASSISTANT

## 2024-06-23 PROCEDURE — 99284 EMERGENCY DEPT VISIT MOD MDM: CPT | Mod: 25

## 2024-06-23 RX ORDER — LEVOFLOXACIN 750 MG/1
750 TABLET ORAL DAILY
Qty: 5 TABLET | Refills: 0 | Status: SHIPPED | OUTPATIENT
Start: 2024-06-23 | End: 2024-06-28

## 2024-06-23 RX ADMIN — IBUPROFEN 600 MG: 400 TABLET ORAL at 07:06

## 2024-06-23 RX ADMIN — CLOSTRIDIUM TETANI TOXOID ANTIGEN (FORMALDEHYDE INACTIVATED), CORYNEBACTERIUM DIPHTHERIAE TOXOID ANTIGEN (FORMALDEHYDE INACTIVATED), BORDETELLA PERTUSSIS TOXOID ANTIGEN (GLUTARALDEHYDE INACTIVATED), BORDETELLA PERTUSSIS FILAMENTOUS HEMAGGLUTININ ANTIGEN (FORMALDEHYDE INACTIVATED), BORDETELLA PERTUSSIS PERTACTIN ANTIGEN, AND BORDETELLA PERTUSSIS FIMBRIAE 2/3 ANTIGEN 0.5 ML: 5; 2; 2.5; 5; 3; 5 INJECTION, SUSPENSION INTRAMUSCULAR at 07:06

## 2024-06-23 NOTE — FIRST PROVIDER EVALUATION
Emergency Department TeleTriage Encounter Note      CHIEF COMPLAINT    Chief Complaint   Patient presents with    Puncture Wound     STEPPED ON NAIL, RT FOOT       VITAL SIGNS   Initial Vitals [06/23/24 1829]   BP Pulse Resp Temp SpO2   (!) 152/86 82 16 98 °F (36.7 °C) 96 %      MAP       --            ALLERGIES    Review of patient's allergies indicates:   Allergen Reactions    Morphine Hives       PROVIDER TRIAGE NOTE  Patient presents with puncture wound to right foot through his flip flop. Last tdap unknown      ORDERS  Labs Reviewed - No data to display    ED Orders (720h ago, onward)      None              Virtual Visit Note: The provider triage portion of this emergency department evaluation and documentation was performed via [employer], a HIPAA-compliant telemedicine application, in concert with a tele-presenter in the room. A face to face patient evaluation with one of my colleagues will occur once the patient is placed in an emergency department room.      DISCLAIMER: This note was prepared with [employer] voice recognition transcription software. Garbled syntax, mangled pronouns, and other bizarre constructions may be attributed to that software system.

## 2024-06-24 NOTE — DISCHARGE INSTRUCTIONS

## 2024-06-24 NOTE — ED PROVIDER NOTES
Encounter Date: 6/23/2024       History     Chief Complaint   Patient presents with    Puncture Wound     STEPPED ON NAIL, RT FOOT     33-year-old otherwise healthy male presents for puncture wound of the right plantar foot.  Patient was walking, stepped on a miah nail attached to a fence, approximately 1.5 in long, through a flip-flop.  Unknown last tetanus.      Review of patient's allergies indicates:   Allergen Reactions    Morphine Hives     No past medical history on file.  Past Surgical History:   Procedure Laterality Date    ADENOIDECTOMY      EYE SURGERY      NASAL SINUS SURGERY      ORBITAL RECONSTRUCTION      TONSILLECTOMY      TYMPANOSTOMY TUBE PLACEMENT       No family history on file.  Social History     Tobacco Use    Smoking status: Never    Smokeless tobacco: Never   Substance Use Topics    Alcohol use: Not Currently     Review of Systems   Constitutional:  Negative for activity change and appetite change.   HENT:  Negative for congestion and drooling.    Eyes:  Negative for discharge and itching.   Respiratory:  Negative for cough and chest tightness.    Cardiovascular:  Negative for chest pain and leg swelling.   Gastrointestinal:  Negative for abdominal distention and abdominal pain.   Genitourinary:  Negative for difficulty urinating and dysuria.   Musculoskeletal:  Negative for arthralgias.   Skin:  Positive for wound. Negative for color change and pallor.   Neurological:  Negative for dizziness and facial asymmetry.   Psychiatric/Behavioral:  Negative for agitation and behavioral problems.        Physical Exam     Initial Vitals [06/23/24 1829]   BP Pulse Resp Temp SpO2   (!) 152/86 82 16 98 °F (36.7 °C) 96 %      MAP       --         Physical Exam    Nursing note and vitals reviewed.  Constitutional: He appears well-developed and well-nourished.   HENT:   Head: Normocephalic and atraumatic.   Mouth/Throat: Oropharynx is clear and moist.   Eyes: Conjunctivae and EOM are normal. Pupils are  equal, round, and reactive to light.   Neck: No thyromegaly present.   Normal range of motion.  Cardiovascular:  Normal rate, regular rhythm and intact distal pulses.           Pulmonary/Chest: Breath sounds normal. No respiratory distress. He has no wheezes.   Abdominal: Abdomen is soft. Bowel sounds are normal. He exhibits no distension. There is no abdominal tenderness.   Musculoskeletal:         General: No tenderness or edema. Normal range of motion.      Cervical back: Normal range of motion.     Neurological: He is alert and oriented to person, place, and time. He has normal strength. No cranial nerve deficit.   Skin: Skin is warm and dry. No rash noted.   Puncture wound to the plantar aspect of the right 2nd metatarsophalangeal junction.   Psychiatric: He has a normal mood and affect. His behavior is normal. Thought content normal.         ED Course   Procedures  Labs Reviewed - No data to display       Imaging Results              X-Ray Foot Complete Right (Final result)  Result time 06/23/24 19:48:09      Final result by Rajiv Vega MD (06/23/24 19:48:09)                   Impression:      No acute findings.      Electronically signed by: Rajiv Vega  Date:    06/23/2024  Time:    19:48               Narrative:    EXAMINATION:  XR FOOT COMPLETE 3 VIEW RIGHT    CLINICAL HISTORY:  . Puncture wound without foreign body, unspecified foot, initial encounter    TECHNIQUE:  AP, lateral, and oblique views of the right foot were performed.    COMPARISON:  None    FINDINGS:  No acute fracture or dislocation.  Bipartite fibular hallux sesamoid.  No radiopaque foreign body.                                       Medications   Tdap vaccine injection 0.5 mL (0.5 mLs Intramuscular Given 6/23/24 1927)   ibuprofen tablet 600 mg (600 mg Oral Given 6/23/24 1904)     Medical Decision Making  Risk  Prescription drug management.               ED Course as of 06/24/24 0422   Sun Jun 23, 2024 2028 X-ray without  evidence of acute fracture or foreign body.  Tdap updated.  Patient ambulatory.  Will give prophylactic antibiotics, local wound care, advised outpatient follow-up with return precautions for worsening symptoms. [BS]      ED Course User Index  [BS] Diomedes Martinez MD                           Clinical Impression:  Final diagnoses:  [S91.339A] Puncture wound of foot          ED Disposition Condition    Discharge Stable          ED Prescriptions       Medication Sig Dispense Start Date End Date Auth. Provider    levoFLOXacin (LEVAQUIN) 750 MG tablet Take 1 tablet (750 mg total) by mouth once daily. for 5 days 5 tablet 6/23/2024 6/28/2024 Diomedes Martinez MD          Follow-up Information       Follow up With Specialties Details Why Contact Info Additional Information    [facility] - Emergency Dept Emergency Medicine Go to  If symptoms worsen [address] [facility] [id number]  [phone] 1st floor             Diomedes Martinez MD  06/24/24 0424     DISPLAY PLAN FREE TEXT